# Patient Record
Sex: FEMALE | Race: WHITE | NOT HISPANIC OR LATINO | Employment: UNEMPLOYED | URBAN - METROPOLITAN AREA
[De-identification: names, ages, dates, MRNs, and addresses within clinical notes are randomized per-mention and may not be internally consistent; named-entity substitution may affect disease eponyms.]

---

## 2017-04-12 ENCOUNTER — HOSPITAL ENCOUNTER (EMERGENCY)
Facility: HOSPITAL | Age: 37
Discharge: PSYCHIATRIC OR SUBSTANCE USE FACILITY | End: 2017-04-12
Attending: EMERGENCY MEDICINE
Payer: MEDICAID

## 2017-04-12 VITALS
HEART RATE: 77 BPM | WEIGHT: 110 LBS | TEMPERATURE: 99 F | SYSTOLIC BLOOD PRESSURE: 118 MMHG | BODY MASS INDEX: 17.68 KG/M2 | RESPIRATION RATE: 20 BRPM | HEIGHT: 66 IN | OXYGEN SATURATION: 99 % | DIASTOLIC BLOOD PRESSURE: 65 MMHG

## 2017-04-12 DIAGNOSIS — F19.10 DRUG ABUSE: ICD-10-CM

## 2017-04-12 DIAGNOSIS — R45.851 SUICIDAL IDEATION: Primary | ICD-10-CM

## 2017-04-12 LAB
ALBUMIN SERPL BCP-MCNC: 4.4 G/DL
ALP SERPL-CCNC: 68 U/L
ALT SERPL W/O P-5'-P-CCNC: 16 U/L
AMPHET+METHAMPHET UR QL: ABNORMAL
ANION GAP SERPL CALC-SCNC: 12 MMOL/L
APAP SERPL-MCNC: <3 UG/ML
AST SERPL-CCNC: 29 U/L
B-HCG UR QL: NEGATIVE
BACTERIA #/AREA URNS HPF: ABNORMAL /HPF
BARBITURATES UR QL SCN>200 NG/ML: NEGATIVE
BASOPHILS # BLD AUTO: 0.04 K/UL
BASOPHILS NFR BLD: 0.4 %
BENZODIAZ UR QL SCN>200 NG/ML: NEGATIVE
BILIRUB SERPL-MCNC: 0.4 MG/DL
BILIRUB UR QL STRIP: ABNORMAL
BUN SERPL-MCNC: 22 MG/DL
BZE UR QL SCN: NEGATIVE
CALCIUM SERPL-MCNC: 8.9 MG/DL
CANNABINOIDS UR QL SCN: NEGATIVE
CHLORIDE SERPL-SCNC: 101 MMOL/L
CLARITY UR: CLEAR
CO2 SERPL-SCNC: 21 MMOL/L
COLOR UR: YELLOW
CREAT SERPL-MCNC: 1.1 MG/DL
CREAT UR-MCNC: 442.4 MG/DL
DIFFERENTIAL METHOD: ABNORMAL
EOSINOPHIL # BLD AUTO: 0.2 K/UL
EOSINOPHIL NFR BLD: 1.8 %
ERYTHROCYTE [DISTWIDTH] IN BLOOD BY AUTOMATED COUNT: 12.2 %
EST. GFR  (AFRICAN AMERICAN): >60 ML/MIN/1.73 M^2
EST. GFR  (NON AFRICAN AMERICAN): >60 ML/MIN/1.73 M^2
ETHANOL SERPL-MCNC: <10 MG/DL
GLUCOSE SERPL-MCNC: 79 MG/DL
GLUCOSE UR QL STRIP: NEGATIVE
HCT VFR BLD AUTO: 37.2 %
HGB BLD-MCNC: 13.5 G/DL
HGB UR QL STRIP: NEGATIVE
HYALINE CASTS #/AREA URNS LPF: 3 /LPF
KETONES UR QL STRIP: NEGATIVE
LEUKOCYTE ESTERASE UR QL STRIP: NEGATIVE
LYMPHOCYTES # BLD AUTO: 4 K/UL
LYMPHOCYTES NFR BLD: 40.4 %
MCH RBC QN AUTO: 30.5 PG
MCHC RBC AUTO-ENTMCNC: 36.3 %
MCV RBC AUTO: 84 FL
METHADONE UR QL SCN>300 NG/ML: NEGATIVE
MICROSCOPIC COMMENT: ABNORMAL
MONOCYTES # BLD AUTO: 0.9 K/UL
MONOCYTES NFR BLD: 8.8 %
NEUTROPHILS # BLD AUTO: 4.8 K/UL
NEUTROPHILS NFR BLD: 48.6 %
NITRITE UR QL STRIP: NEGATIVE
OPIATES UR QL SCN: ABNORMAL
PCP UR QL SCN>25 NG/ML: NEGATIVE
PH UR STRIP: 6 [PH] (ref 5–8)
PLATELET # BLD AUTO: 209 K/UL
PMV BLD AUTO: 9.9 FL
POTASSIUM SERPL-SCNC: 3.4 MMOL/L
PROT SERPL-MCNC: 6.8 G/DL
PROT UR QL STRIP: ABNORMAL
RBC # BLD AUTO: 4.42 M/UL
RBC #/AREA URNS HPF: 2 /HPF (ref 0–4)
SALICYLATES SERPL-MCNC: <5 MG/DL
SODIUM SERPL-SCNC: 134 MMOL/L
SP GR UR STRIP: >=1.03 (ref 1–1.03)
TOXICOLOGY INFORMATION: ABNORMAL
TSH SERPL DL<=0.005 MIU/L-ACNC: 1.62 UIU/ML
UNIDENT CRYS URNS QL MICRO: ABNORMAL
URN SPEC COLLECT METH UR: ABNORMAL
UROBILINOGEN UR STRIP-ACNC: NEGATIVE EU/DL
WBC # BLD AUTO: 9.95 K/UL
WBC #/AREA URNS HPF: 5 /HPF (ref 0–5)
WBC CLUMPS URNS QL MICRO: ABNORMAL

## 2017-04-12 PROCEDURE — 80053 COMPREHEN METABOLIC PANEL: CPT

## 2017-04-12 PROCEDURE — 81000 URINALYSIS NONAUTO W/SCOPE: CPT

## 2017-04-12 PROCEDURE — 84443 ASSAY THYROID STIM HORMONE: CPT

## 2017-04-12 PROCEDURE — 80320 DRUG SCREEN QUANTALCOHOLS: CPT

## 2017-04-12 PROCEDURE — 82570 ASSAY OF URINE CREATININE: CPT

## 2017-04-12 PROCEDURE — 85025 COMPLETE CBC W/AUTO DIFF WBC: CPT

## 2017-04-12 PROCEDURE — 80329 ANALGESICS NON-OPIOID 1 OR 2: CPT

## 2017-04-12 PROCEDURE — 99285 EMERGENCY DEPT VISIT HI MDM: CPT

## 2017-04-12 PROCEDURE — 80307 DRUG TEST PRSMV CHEM ANLYZR: CPT | Mod: 59

## 2017-04-12 PROCEDURE — 80307 DRUG TEST PRSMV CHEM ANLYZR: CPT

## 2017-04-12 PROCEDURE — 81025 URINE PREGNANCY TEST: CPT

## 2017-04-12 NOTE — ED NOTES
Pt moved to psych stretcher now available. Pt resting in ER psych stretcher aaox4, rr e/u, NAD noted. Pt remains in grey gown with yellow socks per protocol. Bed low and locked. FRANTZ Arellano at  performing direct observation at this time. Pt denies further needs, will continue to monitor.

## 2017-04-12 NOTE — ED TRIAGE NOTES
PT HAD SOME PROBLEMS WITH FAMILY MOTHER IS SICK AND SHE HAD PROBLEM WITH SISTER AND THE SISTER CALLED THE CORNER AND WAS OPC PT DENIES SUICIDAL IDEATIONS OR WANTING OTHERS

## 2017-04-12 NOTE — ED NOTES
[facility] at bs to transport pt. Pt ambulatory to vehicle with [facility] representative x2 and [facility] ER security.

## 2017-04-12 NOTE — ED NOTES
Pt resting in ER psych stretcher with eyes closed, rr e/u, NAD noted. Pt remains in grey gown with yellow socks per protocol. Bed low and locked. LILLI Arteaga at  performing direct observation at this time. Pt denies further needs, will continue to monitor.

## 2017-04-12 NOTE — ED PROVIDER NOTES
SCRIBE #1 NOTE: I, Nish Hager Claire, am scribing for, and in the presence of, López Rooney MD. I have scribed the entire note.      History      Chief Complaint   Patient presents with    opc       Review of patient's allergies indicates:   Allergen Reactions    Keflex [cephalexin] Hives        HPI   HPI    4/12/2017, 2:47 PM   History obtained from the patient and OPC      History of Present Illness: Michelle Gr is a 36 y.o. female patient who presents to the Emergency Department for psychiatric evaluation. Per OPC filed by pt's sister, pt is depressed and anxious while also abusing drugs and alcohol. OPC states that pt has not slept in 3 days and has been cursing and throwing objects at family members. OPC states that pt threatened to hang herself if the police were called. OPC states that sister is afraid for pt's safety and the safety of others around pt. Pt is denying any SI or HI. Pt states that her sister is lying and does not know why the police were called. Pt states that she is upset because her mother recently had a stroke. Pt denies any CP, LOC, SI, self injury, hallucinations, HI, or suicide attempt.  No further complaints or concerns at this time.       Arrival mode: EBO    PCP: Brooklynn Joiner NP       Past Medical History:  Past Medical History:   Diagnosis Date    Depression        Past Surgical History:  History reviewed. No pertinent surgical history.      Family History:  History reviewed. No pertinent family history.    Social History:  Social History     Social History Main Topics    Smoking status: Current Every Day Smoker    Smokeless tobacco: unknown    Alcohol use Yes    Drug use: Yes     Special: Cocaine, Marijuana, Methamphetamines      Comment: OPIATES    Sexual activity: Unknown       ROS   Review of Systems   Unable to perform ROS: Psychiatric disorder       Physical Exam    Initial Vitals   BP Pulse Resp Temp SpO2   04/12/17 1418 04/12/17 1418  "04/12/17 1418 04/12/17 1418 04/12/17 1418   127/89 87 20 98.9 °F (37.2 °C) 98 %      Physical Exam  Nursing Notes and Vital Signs Reviewed.  Constitutional: Patient is in no acute distress. Awake and alert. Well-developed and well-nourished.  Head: Atraumatic. Normocephalic.  Eyes: PERRL. EOM intact. Conjunctivae are not pale. No scleral icterus.  ENT: Mucous membranes are moist. Oropharynx is clear and symmetric.    Neck: Supple. Full ROM. No lymphadenopathy.  Cardiovascular: Regular rate. Regular rhythm. No murmurs, rubs, or gallops. Distal pulses are 2+ and symmetric.  Pulmonary/Chest: No respiratory distress. Clear to auscultation bilaterally. No wheezing, rales, or rhonchi.  Abdominal: Soft and non-distended.  There is no tenderness.  No rebound, guarding, or rigidity. Good bowel sounds.  Genitourinary: No CVA tenderness  Musculoskeletal: Moves all extremities. No obvious deformities. No edema. No calf tenderness.  Skin: Warm and dry.  Neurological:  Alert, awake, and appropriate.  Normal speech.  No acute focal neurological deficits are appreciated.  Psychiatric:               Behavior: restless and fidgety, manic              Mood and Affect: agitation, anxious              Thought Process: flight of ideas, scattered              Suicidal Ideations: Yes              Suicidal Plan: hanging              Homicidal Ideations: No              Hallucinations: none      ED Course    Procedures  ED Vital Signs:  Vitals:    04/12/17 1418 04/12/17 1730 04/12/17 1825   BP: 127/89 118/79 118/65   Pulse: 87 88 77   Resp: 20 17 20   Temp: 98.9 °F (37.2 °C) 98.9 °F (37.2 °C)    TempSrc: Oral     SpO2: 98% 97% 99%   Weight: 49.9 kg (110 lb)     Height: 5' 6" (1.676 m)         Abnormal Lab Results:  Labs Reviewed   ACETAMINOPHEN LEVEL - Abnormal; Notable for the following:        Result Value    Acetaminophen (Tylenol), Serum <3.0 (*)     All other components within normal limits   CBC W/ AUTO DIFFERENTIAL - Abnormal; Notable " for the following:     MCHC 36.3 (*)     All other components within normal limits   COMPREHENSIVE METABOLIC PANEL - Abnormal; Notable for the following:     Sodium 134 (*)     Potassium 3.4 (*)     CO2 21 (*)     BUN, Bld 22 (*)     All other components within normal limits   DRUG SCREEN PANEL, URINE EMERGENCY - Abnormal; Notable for the following:     Creatinine, Random Ur 442.4 (*)     All other components within normal limits   URINALYSIS - Abnormal; Notable for the following:     Specific Gravity, UA >=1.030 (*)     Protein, UA 1+ (*)     Bilirubin (UA) 1+ (*)     All other components within normal limits   SALICYLATE LEVEL - Abnormal; Notable for the following:     Salicylate Lvl <5.0 (*)     All other components within normal limits   URINALYSIS MICROSCOPIC - Abnormal; Notable for the following:     WBC Clumps, UA Occasional (*)     Bacteria, UA Few (*)     Hyaline Casts, UA 3 (*)     All other components within normal limits   ALCOHOL,MEDICAL (ETHANOL)   TSH   PREGNANCY TEST, URINE RAPID        All Lab Results:  Results for orders placed or performed during the hospital encounter of 04/12/17   Acetaminophen level   Result Value Ref Range    Acetaminophen (Tylenol), Serum <3.0 (L) 10.0 - 20.0 ug/mL   CBC auto differential   Result Value Ref Range    WBC 9.95 3.90 - 12.70 K/uL    RBC 4.42 4.00 - 5.40 M/uL    Hemoglobin 13.5 12.0 - 16.0 g/dL    Hematocrit 37.2 37.0 - 48.5 %    MCV 84 82 - 98 fL    MCH 30.5 27.0 - 31.0 pg    MCHC 36.3 (H) 32.0 - 36.0 %    RDW 12.2 11.5 - 14.5 %    Platelets 209 150 - 350 K/uL    MPV 9.9 9.2 - 12.9 fL    Gran # 4.8 1.8 - 7.7 K/uL    Lymph # 4.0 1.0 - 4.8 K/uL    Mono # 0.9 0.3 - 1.0 K/uL    Eos # 0.2 0.0 - 0.5 K/uL    Baso # 0.04 0.00 - 0.20 K/uL    Gran% 48.6 38.0 - 73.0 %    Lymph% 40.4 18.0 - 48.0 %    Mono% 8.8 4.0 - 15.0 %    Eosinophil% 1.8 0.0 - 8.0 %    Basophil% 0.4 0.0 - 1.9 %    Differential Method Automated    Comprehensive metabolic panel   Result Value Ref Range     Sodium 134 (L) 136 - 145 mmol/L    Potassium 3.4 (L) 3.5 - 5.1 mmol/L    Chloride 101 95 - 110 mmol/L    CO2 21 (L) 23 - 29 mmol/L    Glucose 79 70 - 110 mg/dL    BUN, Bld 22 (H) 6 - 20 mg/dL    Creatinine 1.1 0.5 - 1.4 mg/dL    Calcium 8.9 8.7 - 10.5 mg/dL    Total Protein 6.8 6.0 - 8.4 g/dL    Albumin 4.4 3.5 - 5.2 g/dL    Total Bilirubin 0.4 0.1 - 1.0 mg/dL    Alkaline Phosphatase 68 55 - 135 U/L    AST 29 10 - 40 U/L    ALT 16 10 - 44 U/L    Anion Gap 12 8 - 16 mmol/L    eGFR if African American >60 >60 mL/min/1.73 m^2    eGFR if non African American >60 >60 mL/min/1.73 m^2   Drug screen panel, emergency   Result Value Ref Range    Benzodiazepines Negative     Methadone metabolites Negative     Cocaine (Metab.) Negative     Opiate Scrn, Ur Presumptive Positive     Barbiturate Screen, Ur Negative     Amphetamine Screen, Ur Presumptive Positive     THC Negative     Phencyclidine Negative     Creatinine, Random Ur 442.4 (H) 15.0 - 325.0 mg/dL    Toxicology Information SEE COMMENT    Ethanol   Result Value Ref Range    Alcohol, Medical, Serum <10 <10 mg/dL   Urinalysis   Result Value Ref Range    Specimen UA Urine, Clean Catch     Color, UA Yellow Yellow, Straw, Kiera    Appearance, UA Clear Clear    pH, UA 6.0 5.0 - 8.0    Specific Gravity, UA >=1.030 (A) 1.005 - 1.030    Protein, UA 1+ (A) Negative    Glucose, UA Negative Negative    Ketones, UA Negative Negative    Bilirubin (UA) 1+ (A) Negative    Occult Blood UA Negative Negative    Nitrite, UA Negative Negative    Urobilinogen, UA Negative <2.0 EU/dL    Leukocytes, UA Negative Negative   TSH   Result Value Ref Range    TSH 1.616 0.400 - 4.000 uIU/mL   Salicylate level   Result Value Ref Range    Salicylate Lvl <5.0 (L) 15.0 - 30.0 mg/dL   Pregnancy, urine rapid   Result Value Ref Range    Preg Test, Ur Negative    Urinalysis Microscopic   Result Value Ref Range    RBC, UA 2 0 - 4 /hpf    WBC, UA 5 0 - 5 /hpf    WBC Clumps, UA Occasional (A) None-Rare     Bacteria, UA Few (A) None-Occ /hpf    Hyaline Casts, UA 3 (A) 0-1/lpf /lpf    Unclass Ines UA Occasional None-Moderate    Microscopic Comment SEE COMMENT               The Emergency Provider reviewed the vital signs and test results, which are outlined above.    ED Discussion     2:47 PM: The [facility]EC hold has been issued by Dr. Rooney at this time for acute psychosis.    3:57 PM: Pt has been medically cleared by Dr. Rooney at this time. Reassessed pt at this time. Pt is resting comfortably and appears in no acute distress. There are no psychiatric services offered at this facility. D/w pt all pertinent ED information and plan to transfer to psychiatric facility Marcelo tillman MD is Dr. Montes De Oca for psychiatric treatment. Patient being transferred by [facility] secondary to need for ongoing personal protection en route. CPR and CPI care will be required en route. All questions and complaints have been addressed at this time. Pt condition is stable at this time and is clear to transfer to psychiatric facility at this time.  No care required during transfer.    ED Medication(s):  Medications - No data to display    Discharge Medication List as of 4/12/2017  6:49 PM                Medical Decision Making    Medical Decision Making:   Clinical Tests:   Lab Tests: Ordered and Reviewed           Scribe Attestation:   Scribe #1: I performed the above scribed service and the documentation accurately describes the services I performed. I attest to the accuracy of the note.    Attending:   Physician Attestation Statement for Scribe #1: I, López Rooney MD, personally performed the services described in this documentation, as scribed by Nish Rangel, in my presence, and it is both accurate and complete.          Clinical Impression       ICD-10-CM ICD-9-CM   1. Suicidal ideation R45.851 V62.84   2. Drug abuse F19.10 305.90       Disposition:   Disposition: Transferred  Condition: Stable         Brody Szymanski  MD  04/16/17 0648

## 2017-04-12 NOTE — ED NOTES
Assumed care of pt at this time. Pt resting in psych stretcher in grey gown with yellow socks per protocol. Pt is aaox4 at this time, denies acute SI, HI and hallucinations. Bed low and locked with LILLI Arteaga at  performing direct observation. Pt denies further needs, will continue to monitor.

## 2017-04-12 NOTE — ED NOTES
Pt accepted at [facility] per Dr. Montes De Oca. Report to be called to [phone]. Pt updated on status and POC; denies further needs. Will continue to monitor.

## 2017-04-12 NOTE — ED NOTES
"ACKNOWLEDGMENT OF RIGHTS SIGNED & PLACED ON CHART.     SAFE WORD: "Pineapple"    PEC process explained to pt. Educated on visiting hours. Verbalizes understanding of POC. Pt remains in grey gown & yellow non slip socks. LILLI Arteaga at bedside for direct observation. NAD noted. Denies any other needs at the time. Will continue to monitor.    CONTACT #1  Delbert Wright-boyfriend  CELL: [phone]    Patient belongings include: one pair of plaid pajama pants and one grey t-shirt. PATIENT BELONGINGS SECURED IN radiology locker #1.    "

## 2017-07-04 ENCOUNTER — HOSPITAL ENCOUNTER (EMERGENCY)
Facility: HOSPITAL | Age: 37
Discharge: ANOTHER HOSPITAL | End: 2017-07-05
Attending: EMERGENCY MEDICINE
Payer: MEDICAID

## 2017-07-04 DIAGNOSIS — R93.0 ABNORMAL CT OF THE HEAD: Primary | ICD-10-CM

## 2017-07-04 DIAGNOSIS — Z23 DIPHTHERIA-TETANUS-PERTUSSIS (DTP) VACCINATION: ICD-10-CM

## 2017-07-04 DIAGNOSIS — R91.1 NODULE OF LEFT LUNG: ICD-10-CM

## 2017-07-04 DIAGNOSIS — S00.81XA ABRASION OF CHEEK, INITIAL ENCOUNTER: ICD-10-CM

## 2017-07-04 DIAGNOSIS — R41.82 ALTERED MENTAL STATUS: ICD-10-CM
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
## 2017-07-04 DIAGNOSIS — Z01.818 ENCOUNTER FOR INTUBATION: ICD-10-CM

## 2017-07-04 DIAGNOSIS — F19.90 DRUG USE: ICD-10-CM

## 2017-07-04 DIAGNOSIS — S02.2XXA CLOSED FRACTURE OF NASAL BONE, INITIAL ENCOUNTER: ICD-10-CM

## 2017-07-04 LAB
ALBUMIN SERPL BCP-MCNC: 4 G/DL
ALP SERPL-CCNC: 49 U/L
ALT SERPL W/O P-5'-P-CCNC: 13 U/L
AMPHET+METHAMPHET UR QL: NORMAL
AMPHET+METHAMPHET UR QL: NORMAL
ANION GAP SERPL CALC-SCNC: 10 MMOL/L
APAP SERPL-MCNC: <3 UG/ML
APTT BLDCRRT: <21 SEC
AST SERPL-CCNC: 18 U/L
B-HCG UR QL: NEGATIVE
BACTERIA #/AREA URNS HPF: ABNORMAL /HPF
BARBITURATES UR QL SCN>200 NG/ML: NEGATIVE
BARBITURATES UR QL SCN>200 NG/ML: NEGATIVE
BASOPHILS # BLD AUTO: 0.01 K/UL
BASOPHILS NFR BLD: 0.1 %
BENZODIAZ UR QL SCN>200 NG/ML: NEGATIVE
BENZODIAZ UR QL SCN>200 NG/ML: NEGATIVE
BILIRUB SERPL-MCNC: 0.2 MG/DL
BILIRUB UR QL STRIP: NEGATIVE
BUN SERPL-MCNC: 14 MG/DL
BZE UR QL SCN: NEGATIVE
BZE UR QL SCN: NEGATIVE
CALCIUM SERPL-MCNC: 8.7 MG/DL
CANNABINOIDS UR QL SCN: NORMAL
CANNABINOIDS UR QL SCN: NORMAL
CHLORIDE SERPL-SCNC: 109 MMOL/L
CLARITY UR: CLEAR
CO2 SERPL-SCNC: 23 MMOL/L
COLOR UR: YELLOW
CREAT SERPL-MCNC: 0.9 MG/DL
CREAT UR-MCNC: 121.8 MG/DL
CREAT UR-MCNC: 121.8 MG/DL
DIFFERENTIAL METHOD: ABNORMAL
EOSINOPHIL # BLD AUTO: 0 K/UL
EOSINOPHIL NFR BLD: 0.4 %
ERYTHROCYTE [DISTWIDTH] IN BLOOD BY AUTOMATED COUNT: 12.7 %
EST. GFR  (AFRICAN AMERICAN): >60 ML/MIN/1.73 M^2
EST. GFR  (NON AFRICAN AMERICAN): >60 ML/MIN/1.73 M^2
ETHANOL SERPL-MCNC: <10 MG/DL
GLUCOSE SERPL-MCNC: 128 MG/DL
GLUCOSE UR QL STRIP: NEGATIVE
HCT VFR BLD AUTO: 40.2 %
HGB BLD-MCNC: 13.9 G/DL
HGB UR QL STRIP: NEGATIVE
HYALINE CASTS #/AREA URNS LPF: 0 /LPF
INR PPP: 1
KETONES UR QL STRIP: NEGATIVE
LEUKOCYTE ESTERASE UR QL STRIP: NEGATIVE
LYMPHOCYTES # BLD AUTO: 1.7 K/UL
LYMPHOCYTES NFR BLD: 18.4 %
MCH RBC QN AUTO: 31 PG
MCHC RBC AUTO-ENTMCNC: 34.6 %
MCV RBC AUTO: 90 FL
METHADONE UR QL SCN>300 NG/ML: NEGATIVE
METHADONE UR QL SCN>300 NG/ML: NEGATIVE
MICROSCOPIC COMMENT: ABNORMAL
MONOCYTES # BLD AUTO: 0.7 K/UL
MONOCYTES NFR BLD: 8 %
NEUTROPHILS # BLD AUTO: 6.6 K/UL
NEUTROPHILS NFR BLD: 73.3 %
NITRITE UR QL STRIP: POSITIVE
OPIATES UR QL SCN: NORMAL
OPIATES UR QL SCN: NORMAL
PCP UR QL SCN>25 NG/ML: NEGATIVE
PCP UR QL SCN>25 NG/ML: NEGATIVE
PH UR STRIP: 6 [PH] (ref 5–8)
PLATELET # BLD AUTO: 148 K/UL
PLATELET BLD QL SMEAR: ABNORMAL
PMV BLD AUTO: 11.5 FL
POCT GLUCOSE: 145 MG/DL (ref 70–110)
POTASSIUM SERPL-SCNC: 3.8 MMOL/L
PROT SERPL-MCNC: 7 G/DL
PROT UR QL STRIP: ABNORMAL
PROTHROMBIN TIME: 10.2 SEC
RBC # BLD AUTO: 4.49 M/UL
RBC #/AREA URNS HPF: 0 /HPF (ref 0–4)
SALICYLATES SERPL-MCNC: <5 MG/DL
SODIUM SERPL-SCNC: 142 MMOL/L
SP GR UR STRIP: 1.02 (ref 1–1.03)
SQUAMOUS #/AREA URNS HPF: 2 /HPF
TOXICOLOGY INFORMATION: NORMAL
TOXICOLOGY INFORMATION: NORMAL
URN SPEC COLLECT METH UR: ABNORMAL
UROBILINOGEN UR STRIP-ACNC: NEGATIVE EU/DL
WBC # BLD AUTO: 9.04 K/UL
WBC #/AREA URNS HPF: 4 /HPF (ref 0–5)

## 2017-07-04 PROCEDURE — 63600175 PHARM REV CODE 636 W HCPCS

## 2017-07-04 PROCEDURE — 80307 DRUG TEST PRSMV CHEM ANLYZR: CPT

## 2017-07-04 PROCEDURE — 99291 CRITICAL CARE FIRST HOUR: CPT | Mod: 25

## 2017-07-04 PROCEDURE — 31500 INSERT EMERGENCY AIRWAY: CPT

## 2017-07-04 PROCEDURE — 25000003 PHARM REV CODE 250: Performed by: EMERGENCY MEDICINE

## 2017-07-04 PROCEDURE — 80053 COMPREHEN METABOLIC PANEL: CPT

## 2017-07-04 PROCEDURE — 81025 URINE PREGNANCY TEST: CPT

## 2017-07-04 PROCEDURE — 96376 TX/PRO/DX INJ SAME DRUG ADON: CPT

## 2017-07-04 PROCEDURE — 81000 URINALYSIS NONAUTO W/SCOPE: CPT

## 2017-07-04 PROCEDURE — 85610 PROTHROMBIN TIME: CPT

## 2017-07-04 PROCEDURE — 96374 THER/PROPH/DIAG INJ IV PUSH: CPT

## 2017-07-04 PROCEDURE — 85025 COMPLETE CBC W/AUTO DIFF WBC: CPT

## 2017-07-04 PROCEDURE — 63600175 PHARM REV CODE 636 W HCPCS: Performed by: EMERGENCY MEDICINE

## 2017-07-04 PROCEDURE — 96361 HYDRATE IV INFUSION ADD-ON: CPT

## 2017-07-04 PROCEDURE — 82962 GLUCOSE BLOOD TEST: CPT

## 2017-07-04 PROCEDURE — 90471 IMMUNIZATION ADMIN: CPT | Performed by: EMERGENCY MEDICINE

## 2017-07-04 PROCEDURE — 85730 THROMBOPLASTIN TIME PARTIAL: CPT

## 2017-07-04 PROCEDURE — 80329 ANALGESICS NON-OPIOID 1 OR 2: CPT

## 2017-07-04 PROCEDURE — 96375 TX/PRO/DX INJ NEW DRUG ADDON: CPT

## 2017-07-04 PROCEDURE — 80320 DRUG SCREEN QUANTALCOHOLS: CPT

## 2017-07-04 PROCEDURE — 90715 TDAP VACCINE 7 YRS/> IM: CPT | Performed by: EMERGENCY MEDICINE

## 2017-07-04 PROCEDURE — 25000003 PHARM REV CODE 250

## 2017-07-04 RX ORDER — PROPOFOL 10 MG/ML
5 INJECTION, EMULSION INTRAVENOUS CONTINUOUS
Status: DISCONTINUED | OUTPATIENT
Start: 2017-07-05 | End: 2017-07-05 | Stop reason: HOSPADM

## 2017-07-04 RX ORDER — NALOXONE HYDROCHLORIDE 1 MG/ML
INJECTION INTRAMUSCULAR; INTRAVENOUS; SUBCUTANEOUS
Status: COMPLETED
Start: 2017-07-04 | End: 2017-07-04

## 2017-07-04 RX ORDER — SODIUM CHLORIDE 9 MG/ML
1000 INJECTION, SOLUTION INTRAVENOUS
Status: COMPLETED | OUTPATIENT
Start: 2017-07-04 | End: 2017-07-04

## 2017-07-04 RX ORDER — NALOXONE HCL 0.4 MG/ML
1 VIAL (ML) INJECTION ONCE
Status: DISCONTINUED | OUTPATIENT
Start: 2017-07-04 | End: 2017-07-04

## 2017-07-04 RX ORDER — ETOMIDATE 2 MG/ML
20 INJECTION INTRAVENOUS
Status: COMPLETED | OUTPATIENT
Start: 2017-07-05 | End: 2017-07-04

## 2017-07-04 RX ADMIN — ETOMIDATE 20 MG: 2 INJECTION, SOLUTION INTRAVENOUS at 11:07

## 2017-07-04 RX ADMIN — CLOSTRIDIUM TETANI TOXOID ANTIGEN (FORMALDEHYDE INACTIVATED), CORYNEBACTERIUM DIPHTHERIAE TOXOID ANTIGEN (FORMALDEHYDE INACTIVATED), BORDETELLA PERTUSSIS TOXOID ANTIGEN (GLUTARALDEHYDE INACTIVATED), BORDETELLA PERTUSSIS FILAMENTOUS HEMAGGLUTININ ANTIGEN (FORMALDEHYDE INACTIVATED), BORDETELLA PERTUSSIS PERTACTIN ANTIGEN, AND BORDETELLA PERTUSSIS FIMBRIAE 2/3 ANTIGEN 0.5 ML: 5; 2; 2.5; 5; 3; 5 INJECTION, SUSPENSION INTRAMUSCULAR at 08:07

## 2017-07-04 RX ADMIN — PROPOFOL 40 MG: 10 INJECTION, EMULSION INTRAVENOUS at 11:07

## 2017-07-04 RX ADMIN — NALOXONE HYDROCHLORIDE 1 MG: 1 INJECTION PARENTERAL at 09:07

## 2017-07-04 RX ADMIN — SODIUM CHLORIDE 1000 ML: 0.9 INJECTION, SOLUTION INTRAVENOUS at 08:07

## 2017-07-04 RX ADMIN — VANCOMYCIN HYDROCHLORIDE 1000 MG: 1 INJECTION, POWDER, LYOPHILIZED, FOR SOLUTION INTRAVENOUS at 11:07

## 2017-07-05 VITALS
SYSTOLIC BLOOD PRESSURE: 120 MMHG | BODY MASS INDEX: 21.97 KG/M2 | RESPIRATION RATE: 16 BRPM | HEART RATE: 84 BPM | HEIGHT: 67 IN | WEIGHT: 140 LBS | OXYGEN SATURATION: 100 % | TEMPERATURE: 99 F | DIASTOLIC BLOOD PRESSURE: 79 MMHG

## 2017-07-05 PROCEDURE — 63600175 PHARM REV CODE 636 W HCPCS: Performed by: EMERGENCY MEDICINE

## 2017-07-05 PROCEDURE — 27000221 HC OXYGEN, UP TO 24 HOURS

## 2017-07-05 PROCEDURE — 99900035 HC TECH TIME PER 15 MIN (STAT)

## 2017-07-05 PROCEDURE — 27100108

## 2017-07-05 PROCEDURE — 27200966 HC CLOSED SUCTION SYSTEM

## 2017-07-05 PROCEDURE — 94002 VENT MGMT INPAT INIT DAY: CPT

## 2017-07-05 PROCEDURE — 25000003 PHARM REV CODE 250: Performed by: EMERGENCY MEDICINE

## 2017-07-05 RX ORDER — PROPOFOL 10 MG/ML
40 VIAL (ML) INTRAVENOUS ONCE
Status: COMPLETED | OUTPATIENT
Start: 2017-07-05 | End: 2017-07-04

## 2017-07-05 RX ADMIN — PROPOFOL 5 MCG/KG/MIN: 10 INJECTION, EMULSION INTRAVENOUS at 12:07

## 2017-07-05 NOTE — ED NOTES
Pt remains unresponsive to verbal stimuli. Only awakens to painful stimuli with no meaningful purposeful movements. MD aware no new orders given. At  continuing to monitor pt.

## 2017-07-05 NOTE — ED NOTES
"Pt arrived with C collar and unresponsive to verbal stimuli. Pt has bloody lips, swollen upper lip with dry blood noted. Pt pupils pin point, sluggish. + gag reflex, rr e/u. Retracts from painful stimuli. Unable to give history or answer questions. Pt  Found this way and "friend" called 911. Pt has track marks bilaterally forearms. Evidence of possible substance abuse.   "

## 2017-07-05 NOTE — ED NOTES
Pt remains in c collar. Neuro status unchanged. MD aWARE. PT TO BE TRANSPORT TO [facility] FOR NEURO. PT REMANS WITH GAG, RR E/U. PUPILS PIN POINT SLUGGISH. EQUAL.

## 2017-07-05 NOTE — ED NOTES
Pt had episode of sitting up without making eye contact or purposeful movement. Moved legs and arms forward and sat up in bed on the way back from ct. Then immediately went back to previous neuro status.

## 2017-07-05 NOTE — ED NOTES
No response to narcan. Pt remains non verbal. Retracts to painful stimuli. Pt in collar from EMS.

## 2017-07-05 NOTE — ED NOTES
rissa at  again for pt transport. Pt intubated. Airway protected for transport. Vss. [facility] updated on pt status.

## 2017-07-05 NOTE — ED PROVIDER NOTES
SCRIBE #1 NOTE: I, Ayanna Barraza, am scribing for, and in the presence of, Lennie Munoz DO. I have scribed the entire note.      History      Chief Complaint   Patient presents with    Fall     Fell earlier today hitting face refused transport. EMS called again due to decrease in mental status . Person with her reports took pills ,unsure of type       Review of patient's allergies indicates:   Allergen Reactions    Keflex [cephalexin] Hives        HPI   HPI    7/4/2017, 8:41 PM   History obtained from EMS      History of Present Illness: Michelle Gr is a 36 y.o. female patient who presents to the Emergency Department for fall which onset today around 1915 per EMS. EMS reports a GCS of 12 and states they were called out twice. States that the first call was around 1900 and pt denied help. States that the second call was around 1930 after pt had taken neurontin (and possibly other pills) and fallen again. Symptoms are constant and moderate in severity.  No mitigating or exacerbating factors reported. Pt is unresponsive therefore HPI is limited.  No further complaints or concerns at this time.         Arrival mode: EMS    PCP: Brooklynn Joiner NP       Past Medical History:  Past Medical History:   Diagnosis Date    Depression        Past Surgical History:  History reviewed. No pertinent surgical history.      Family History:  History reviewed. No pertinent family history.    Social History:  Social History     Social History Main Topics    Smoking status: Current Every Day Smoker    Smokeless tobacco: Unknown    Alcohol use Yes    Drug use:      Types: Cocaine, Marijuana, Methamphetamines      Comment: OPIATES    Sexual activity: Unknown       ROS   Review of Systems   Unable to perform ROS: Patient unresponsive       Physical Exam      Initial Vitals   BP Pulse Resp Temp SpO2   07/04/17 2024 07/04/17 2024 07/04/17 2024 07/04/17 2028 07/04/17 2024   120/78 88 16 98.1 °F (36.7 °C) 100 %      MAP        07/04/17 2024 92          Physical Exam  Nursing Notes and Vital Signs Reviewed.  Constitutional: Patient is in no apparent distress.GCS: 7 (E1 V1 M5)  Head: . Normocephalic.  Eyes: PERRL. Pinpoint. Conjunctivae are not pale. No scleral icterus.  ENT: Mucous membranes are moist. Oropharynx is clear and symmetric. Gag reflex intact.  No septal hematoma  Neck: C collar in place.  No crepitance  Cardiovascular: Regular rate. Regular rhythm. No murmurs, rubs, or gallops. Distal pulses are 2+ and symmetric.  Pulmonary/Chest: No respiratory distress. Clear to auscultation bilaterally. No wheezing, rales, or rhonchi.  Abdominal: Soft and non-distended. Good bowel sounds.  Musculoskeletal: No obvious deformities. Abrasion noted to left knee.   Skin: Warm and dry. Abrasions noted to L cheek , inferior left nares.Track marks noted to bilat arms.  Neurological:  GCS = 7 Patellar reflexes are equal and 2+ bilat.           ED Course    Critical Care  Date/Time: 7/4/2017 8:40 PM  Performed by: ZENON QUIJANO  Authorized by: ZENON QUIJANO   Direct patient critical care time: 10 minutes  Additional history critical care time: 5 minutes  Ordering / reviewing critical care time: 5 minutes  Documentation critical care time: 5 minutes  Consulting other physicians critical care time: 5 minutes  Consult with family critical care time: 5 minutes  Other critical care time: 5 minutes  Total critical care time (exclusive of procedural time) : 40 minutes  Critical care time was exclusive of separately billable procedures and treating other patients.  Critical care was necessary to treat or prevent imminent or life-threatening deterioration of the following conditions: CNS failure or compromise and toxidrome (neurologic).  Critical care was time spent personally by me on the following activities: blood draw for specimens, discussions with consultants, evaluation of patient's response to treatment, obtaining history from  patient or surrogate, ordering and review of laboratory studies, pulse oximetry, interpretation of cardiac output measurements, examination of patient, ordering and performing treatments and interventions, ordering and review of radiographic studies and re-evaluation of patient's condition.    Intubation  Date/Time: 7/4/2017 11:47 PM  Performed by: ZENON QUIJANO  Authorized by: ZENON QUIJANO   Consent Done: Emergent Situation  Indications: airway protection  Patient status: sedated  Sedatives: etomidate and propofol  Paralytic: none  Laryngoscope size: Mac 4  Tube size: 7.5 mm  Tube type: cuffed  Number of attempts: 1  Cricoid pressure: no  Cords visualized: yes  Post-procedure assessment: CO2 detector  Breath sounds: clear (equal, positive CO2 change)  Cuff inflated: yes  ETT to lip: 23 cm  Tube secured with: ties  Chest x-ray interpreted by me.  Chest x-ray findings: endotracheal tube in appropriate position  Patient tolerance: Patient tolerated the procedure well with no immediate complications  Complications: No  Comments: Et tube pulled back to 22.      Note:  Patient intubated after CT scan performed.   No bag/mask used.   Intubation easily performed with one attempt.    ED Vital Signs:  Vitals:    07/04/17 2039 07/04/17 2059 07/04/17 2120 07/04/17 2149   BP: 116/73 (!) 98/58 110/69 126/73   Pulse: 86 86 87    Resp: 17 16 19    Temp:       TempSrc:       SpO2: 100% 100% 100% 100%   Weight:       Height:        07/04/17 2204 07/04/17 2210 07/04/17 2219 07/04/17 2234   BP: 117/75  104/66 107/60   Pulse: 90  87 87   Resp: 17 16 16   Temp:  98.2 °F (36.8 °C)     TempSrc:  Axillary     SpO2: 99%  100% 99%   Weight:       Height:        07/04/17 2304 07/04/17 2319 07/04/17 2323 07/05/17 0003   BP: 115/81 115/80  122/86   Pulse: 79 79  86   Resp: 14 17 16   Temp:   98.9 °F (37.2 °C)    TempSrc:   Rectal    SpO2: 100% 99%  100%   Weight:       Height:        07/05/17 0012 07/05/17 0016 07/05/17 0018   BP:    120/79   Pulse: 85 85 84   Resp:   16   Temp:      TempSrc:      SpO2: 100% 100% 100%   Weight:      Height:          Abnormal Lab Results:  Labs Reviewed   CBC W/ AUTO DIFFERENTIAL - Abnormal; Notable for the following:        Result Value    Platelets 148 (*)     Gran% 73.3 (*)     Platelet Estimate Clumped (*)     All other components within normal limits   COMPREHENSIVE METABOLIC PANEL - Abnormal; Notable for the following:     Glucose 128 (*)     Alkaline Phosphatase 49 (*)     All other components within normal limits   ACETAMINOPHEN LEVEL - Abnormal; Notable for the following:     Acetaminophen (Tylenol), Serum <3.0 (*)     All other components within normal limits   SALICYLATE LEVEL - Abnormal; Notable for the following:     Salicylate Lvl <5.0 (*)     All other components within normal limits   URINALYSIS - Abnormal; Notable for the following:     Protein, UA 2+ (*)     Nitrite, UA Positive (*)     All other components within normal limits   URINALYSIS MICROSCOPIC - Abnormal; Notable for the following:     Bacteria, UA Moderate (*)     All other components within normal limits   POCT GLUCOSE - Abnormal; Notable for the following:     POCT Glucose 145 (*)     All other components within normal limits   ALCOHOL,MEDICAL (ETHANOL)   PREGNANCY TEST, URINE RAPID   DRUG SCREEN PANEL, URINE EMERGENCY   PROTIME-INR   APTT   DRUG SCREEN PANEL, URINE EMERGENCY        All Lab Results:  Results for orders placed or performed during the hospital encounter of 07/04/17   CBC auto differential   Result Value Ref Range    WBC 9.04 3.90 - 12.70 K/uL    RBC 4.49 4.00 - 5.40 M/uL    Hemoglobin 13.9 12.0 - 16.0 g/dL    Hematocrit 40.2 37.0 - 48.5 %    MCV 90 82 - 98 fL    MCH 31.0 27.0 - 31.0 pg    MCHC 34.6 32.0 - 36.0 %    RDW 12.7 11.5 - 14.5 %    Platelets 148 (L) 150 - 350 K/uL    MPV 11.5 9.2 - 12.9 fL    Gran # 6.6 1.8 - 7.7 K/uL    Lymph # 1.7 1.0 - 4.8 K/uL    Mono # 0.7 0.3 - 1.0 K/uL    Eos # 0.0 0.0 - 0.5 K/uL     Baso # 0.01 0.00 - 0.20 K/uL    Gran% 73.3 (H) 38.0 - 73.0 %    Lymph% 18.4 18.0 - 48.0 %    Mono% 8.0 4.0 - 15.0 %    Eosinophil% 0.4 0.0 - 8.0 %    Basophil% 0.1 0.0 - 1.9 %    Platelet Estimate Clumped (A)     Differential Method Automated    Comprehensive metabolic panel   Result Value Ref Range    Sodium 142 136 - 145 mmol/L    Potassium 3.8 3.5 - 5.1 mmol/L    Chloride 109 95 - 110 mmol/L    CO2 23 23 - 29 mmol/L    Glucose 128 (H) 70 - 110 mg/dL    BUN, Bld 14 6 - 20 mg/dL    Creatinine 0.9 0.5 - 1.4 mg/dL    Calcium 8.7 8.7 - 10.5 mg/dL    Total Protein 7.0 6.0 - 8.4 g/dL    Albumin 4.0 3.5 - 5.2 g/dL    Total Bilirubin 0.2 0.1 - 1.0 mg/dL    Alkaline Phosphatase 49 (L) 55 - 135 U/L    AST 18 10 - 40 U/L    ALT 13 10 - 44 U/L    Anion Gap 10 8 - 16 mmol/L    eGFR if African American >60 >60 mL/min/1.73 m^2    eGFR if non African American >60 >60 mL/min/1.73 m^2   Acetaminophen level   Result Value Ref Range    Acetaminophen (Tylenol), Serum <3.0 (L) 10.0 - 20.0 ug/mL   Ethanol   Result Value Ref Range    Alcohol, Medical, Serum <10 <10 mg/dL   Salicylate level   Result Value Ref Range    Salicylate Lvl <5.0 (L) 15.0 - 30.0 mg/dL   Urinalysis   Result Value Ref Range    Specimen UA Urine, Catheterized     Color, UA Yellow Yellow, Straw, Kiera    Appearance, UA Clear Clear    pH, UA 6.0 5.0 - 8.0    Specific Gravity, UA 1.020 1.005 - 1.030    Protein, UA 2+ (A) Negative    Glucose, UA Negative Negative    Ketones, UA Negative Negative    Bilirubin (UA) Negative Negative    Occult Blood UA Negative Negative    Nitrite, UA Positive (A) Negative    Urobilinogen, UA Negative <2.0 EU/dL    Leukocytes, UA Negative Negative   Pregnancy, urine rapid   Result Value Ref Range    Preg Test, Ur Negative    Drug screen panel, emergency   Result Value Ref Range    Benzodiazepines Negative     Methadone metabolites Negative     Cocaine (Metab.) Negative     Opiate Scrn, Ur Presumptive Positive     Barbiturate Screen, Ur  Negative     Amphetamine Screen, Ur Presumptive Positive     THC Presumptive Positive     Phencyclidine Negative     Creatinine, Random Ur 121.8 15.0 - 325.0 mg/dL    Toxicology Information SEE COMMENT    Protime-INR   Result Value Ref Range    Prothrombin Time 10.2 9.0 - 12.5 sec    INR 1.0 0.8 - 1.2   APTT   Result Value Ref Range    aPTT <21.0 21.0 - 32.0 sec   Drug screen panel, emergency   Result Value Ref Range    Benzodiazepines Negative     Methadone metabolites Negative     Cocaine (Metab.) Negative     Opiate Scrn, Ur Presumptive Positive     Barbiturate Screen, Ur Negative     Amphetamine Screen, Ur Presumptive Positive     THC Presumptive Positive     Phencyclidine Negative     Creatinine, Random Ur 121.8 15.0 - 325.0 mg/dL    Toxicology Information SEE COMMENT    Urinalysis Microscopic   Result Value Ref Range    RBC, UA 0 0 - 4 /hpf    WBC, UA 4 0 - 5 /hpf    Bacteria, UA Moderate (A) None-Occ /hpf    Squam Epithel, UA 2 /hpf    Hyaline Casts, UA 0 0-1/lpf /lpf    Microscopic Comment SEE COMMENT    POCT glucose   Result Value Ref Range    POCT Glucose 145 (H) 70 - 110 mg/dL         Imaging Results:  Imaging Results          X-Ray Chest 1 View for Tube Placement (In process)                CT Head Without Contrast (Final result)  Result time 07/04/17 21:54:56    Final result by Obey Chadwick MD (07/04/17 21:54:56)                 Impression:     Normal CT of the brain.  Motion artifact limits detail.      Electronically signed by: OBEY CHADWICK MD  Date:     07/04/17  Time:    21:54              Narrative:    The CT of the brain without contrast    Clinical indication: Transient alteration of awareness    Findings: Patient moved considerably during both attempts at scanning the brain.  The ventricles are normal in size and position without midline shift.  Cerebral density is normal.  No hemorrhage or mass effect.                             CT Cervical Spine Without Contrast (Final result)  Result  time 07/04/17 22:11:34    Final result by Obey Chadwick MD (07/04/17 22:11:34)                 Impression:      1: The cervical spine is intact without fractures or subluxation.  There is bulging of the C6-C7 disc  2: Subcutaneous emphysema as described above with subarachnoid air bubbles and the cavernous sinus region and around C1 and C2 as well as posterior nasal pharynx, around the left mandible and right sternocleidomastoid muscle.        Electronically signed by: OBEY CHADWICK MD  Date:     07/04/17  Time:    22:11              Narrative:    CT of the cervical spine without contrast    Clinical indication: Neck pain acute trauma    Findings: Axial imaging was obtained through the cervical spine with sagittal and coronal reconstructions.  The cervical spine is intact without fractures or subluxations.  There is mild spurring around the C5-C6 disc space but the thecal sac is preserved.  There is annular bulging of the C6-C7 disc mildly compressing anterior thecal sac.    There are small air bubbles in the soft tissues in the deep to the right sternocleidomastoid muscle, around the left mandible, just posterior nasal pharynx and extending along the anterior aspect of the thecal sac at the C1-C2 level in the subarachnoid space.  Small subarachnoid air bubbles are also present posterior to the C2 vertebral body and in the left C2-C3 and 3-4 neural foramina..  There is no motion artifact to the base of the brain on the CT of the cervical spine and there are multiple air bubbles to the cavernous sinuses on both sides as well.  There are air bubbles in the soft tissues posterior nasal pharynx.  The exact origin of these subarachnoid air bubbles are uncertain.  I do not see a discrete cribriform plate fracture.                             CT Maxillofacial Without Contrast (Final result)  Result time 07/04/17 22:14:40    Final result by Obey Chadwick MD (07/04/17 22:14:40)                 Impression:       There is fracturing of the nasal bones anteriorly.  Subcutaneous emphysema as described above along with subarachnoid air as described above.      Electronically signed by: OBEY CHADWICK MD  Date:     07/04/17  Time:    22:14              Narrative:    CT of the facial bones    Clinical indication: Facial trauma    Findings: Axial imaging was obtained through the facial bones with sagittal and coronal reconstructions.  As mentioned on the CT the cervical spine there are air bubbles in the cavernous sinus region on both sides extending into the posterior retro-orbital regions.  Small air bubbles are present in the subarachnoid space of the upper cervical spine and posterior to the nasal pharynx.  There are some small air bubbles around the left mandible.  There does appear to be fracturing of the nasal bones.  I do not see a discrete fracture of the cribriform plate.  Cannot definitely explain the origin of these air bubbles.  There is turbinate congestion.  The orbital rims are intact.  There is a metal plate along the left mandible.  No definite mandibular fractures.                             X-Ray Chest AP Portable (Final result)  Result time 07/04/17 21:27:40    Final result by Obey Chadwick MD (07/04/17 21:27:40)                 Impression:     2 cm nodule in the left lower lung field overlapping left posterior 8th rib.  Cannot exclude neoplasm.  Further evaluation is needed.  No active infiltrates      Electronically signed by: OBEY CHADWICK MD  Date:     07/04/17  Time:    21:27              Narrative:    Portable chest    Clinical indication: Chest pain and transient alteration of awareness    Findings: The heart is normal in size.  There is a 2 cm nodule overlapping the right posterior 8th rib.  The lungs elsewhere are clear.  No active infiltrates.                                    The Emergency Provider reviewed the vital signs and test results, which are outlined above.    ED Discussion   8:54  PM: Pt's respiration and level of consciousness is declining.  No response to Narcan.    21:30 Patient returning from CT Patient thrashing on bed, confused.  Airway intact.     10:50 PM: Consult with Tania Rosado () at [facility] concerning pt. There are no neurosurgical services, which the patient requires, offered at [facility] at this time.  (neurosurgeon) expresses understanding and will accept transfer for neurosurgical services  Accepting Facility: [facility]  Accepting Physician: Dr. Núñez (neurosurgeon)    10:59 PM: Re-evaluated patient:   Pupils 8 mm, bilateral, reactive.  No eye opening to painful stimulus, Mumbles to painful stimulus, withdrawals from pain. GCS = 8.  Positive gag - patient sits up in bed after checking gag and rolls to the side.  There are no family at bedside to discuss.  Patient is obtunded.  Patient requires neurosurgical care, which is not available at this facility.  Discussed case with Michael Rosado () - Dr. KACEY Núñez is accepting. Risks:  MVC, death, seizure, permanent neurologic damage.  Benefits:   Neurosurgical services.  Benefits of transfer outweigh risks.        11:43 PM: Re-evaluated pt neurologically. Pt does not have a gag reflex at this time .Pt's pupils were noted to be 2+ mm and reactive. Pt will be intubated.    11:48 PM: Prior to intubation pt yannick legs to chest legs and crossed them at ankles almost bringing ankles above head  Neuro exam inconsistent.  Will intubate to protect airway in rout.    12:05 AM: Dr. Tammy DO discussed the pt's case with Alyssa at [facility] (). Notified Alyssa that pt has been intubated.     ED Medication(s):  Medications   0.9%  NaCl infusion (0 mLs Intravenous Stopped 7/4/17 2307)   Tdap vaccine injection 0.5 mL (0.5 mLs Intramuscular Given 7/4/17 2055)   naloxone (NARCAN) 1 mg/mL injection (1 mg  Given 7/4/17 2100)   vancomycin 1 g in dextrose 5 % 250 mL IVPB  (ready to mix system) (1,000 mg Intravenous New Bag 7/4/17 1137)   etomidate injection 20 mg (20 mg Intravenous Given by Other 7/4/17 5172)   propofol (DIPRIVAN) 10 mg/mL IVP (40 mg Intravenous Given by Other 7/4/17 9613)       Discharge Medication List as of 7/5/2017 12:35 AM                Medical Decision Making    Medical Decision Making:   Clinical Tests:   Lab Tests: Ordered and Reviewed  Radiological Study: Ordered and Reviewed           Scribe Attestation:   Scribe #1: I performed the above scribed service and the documentation accurately describes the services I performed. I attest to the accuracy of the note.    Attending:   Physician Attestation Statement for Scribe #1: I, Lennie Munoz DO, personally performed the services described in this documentation, as scribed by Ayanna Barraza, in my presence, and it is both accurate and complete.          Clinical Impression       ICD-10-CM ICD-9-CM   1. Abnormal CT of the head - air in subarchnoid space R93.0 793.0   2. Altered mental status R41.82 780.97   3. Nodule of left lung - 2 cm. R91.1 793.11   4. Closed fracture of nasal bone, initial encounter S02.2XXA 802.0   5. Drug use F19.90 305.90   6. Abrasion of cheek, initial encounter S00.81XA 910.0   7. Fall, initial encounter W19.XXXA E888.9   8. Diphtheria-tetanus-pertussis (DTP) vaccination Z23 V06.1   9. Encounter for intubation Z01.818 V72.83       Disposition:   Disposition: Transferred  Condition: Stable         Lennie Munoz,   07/05/17 0351       Lennie Munoz,   07/05/17 0353       Lennie Munoz,   07/06/17 0206

## 2021-02-22 ENCOUNTER — HISTORICAL (OUTPATIENT)
Dept: ADMINISTRATIVE | Facility: HOSPITAL | Age: 41
End: 2021-02-22

## 2021-02-25 LAB — FINAL CULTURE: NORMAL

## 2021-04-23 ENCOUNTER — HISTORICAL (OUTPATIENT)
Dept: LAB | Facility: HOSPITAL | Age: 41
End: 2021-04-23

## 2021-04-23 LAB
ABS NEUT (OLG): 6.07
ALBUMIN SERPL-MCNC: 4 GM/DL (ref 3.5–5)
ALBUMIN/GLOB SERPL: 1.4 RATIO (ref 1.1–2)
ALP SERPL-CCNC: 59 UNIT/L (ref 40–150)
ALT SERPL-CCNC: 63 UNIT/L (ref 0–55)
AST SERPL-CCNC: 32 UNIT/L (ref 5–34)
BASOPHILS # BLD AUTO: 0.01 X10(3)/MCL
BASOPHILS NFR BLD AUTO: 0.1 %
BILIRUB SERPL-MCNC: 0.8 MG/DL
BILIRUBIN DIRECT+TOT PNL SERPL-MCNC: 0.4 MG/DL
BILIRUBIN DIRECT+TOT PNL SERPL-MCNC: 0.4 MG/DL (ref 0–0.5)
BUN SERPL-MCNC: 13 MG/DL (ref 7–18.7)
CALCIUM SERPL-MCNC: 9 MG/DL (ref 8.4–10.2)
CHLORIDE SERPL-SCNC: 106 MMOL/L (ref 98–107)
CHOLEST SERPL-MCNC: 137 MG/DL
CHOLEST/HDLC SERPL: 2 {RATIO} (ref 0–5)
CO2 SERPL-SCNC: 29 MEQ/L (ref 22–29)
CREAT SERPL-MCNC: 0.77 MG/DL (ref 0.55–1.02)
EOSINOPHIL # BLD AUTO: 0.06 X10(3)/MCL
EOSINOPHIL NFR BLD AUTO: 0.7 %
ERYTHROCYTE [DISTWIDTH] IN BLOOD BY AUTOMATED COUNT: 14 %
GLOBULIN SER-MCNC: 2.8 GM/DL (ref 2.4–3.5)
GLUCOSE SERPL-MCNC: 102 MG/DL (ref 74–100)
HCT VFR BLD AUTO: 43.5 % (ref 34–46)
HDLC SERPL-MCNC: 73 MG/DL (ref 35–60)
HGB BLD-MCNC: 13.6 GM/DL (ref 11.3–15.4)
HIV 1+2 AB+HIV1 P24 AG SERPL QL IA: NONREACTIVE
IMM GRANULOCYTES # BLD AUTO: 0.01 10*3/UL (ref 0–0.1)
IMM GRANULOCYTES NFR BLD AUTO: 0.1 % (ref 0–1)
LDLC SERPL CALC-MCNC: 53 MG/DL (ref 50–140)
LYMPHOCYTES # BLD AUTO: 1.58 X10(3)/MCL
LYMPHOCYTES NFR BLD AUTO: 19 %
MCH RBC QN AUTO: 29.6 PG (ref 27–33)
MCHC RBC AUTO-ENTMCNC: 31.3 GM/DL (ref 32–35)
MCV RBC AUTO: 94.6 FL (ref 81–97)
MONOCYTES # BLD AUTO: 0.6 X10(3)/MCL
MONOCYTES NFR BLD AUTO: 7.2 %
NEUTROPHILS # BLD AUTO: 6.07 X10(3)/MCL
NEUTROPHILS NFR BLD AUTO: 72.9 %
PLATELET # BLD AUTO: 162 X10(3)/MCL (ref 140–450)
PMV BLD AUTO: 11 FL
POTASSIUM SERPL-SCNC: 4.4 MMOL/L (ref 3.5–5.1)
PROT SERPL-MCNC: 6.8 GM/DL (ref 6.4–8.3)
RBC # BLD AUTO: 4.6 X10(6)/MCL (ref 3.9–5)
RPR SER QL: NORMAL
SODIUM SERPL-SCNC: 141 MMOL/L (ref 136–145)
T PALLIDUM AB SER QL: REACTIVE
TRIGL SERPL-MCNC: 55 MG/DL (ref 37–140)
TSH SERPL-ACNC: 1.24 UIU/ML (ref 0.35–4.94)
VLDLC SERPL CALC-MCNC: 11 MG/DL
WBC # SPEC AUTO: 8.33 X10(3)/MCL (ref 3.4–9.2)

## 2021-06-07 ENCOUNTER — HISTORICAL (OUTPATIENT)
Dept: RADIOLOGY | Facility: HOSPITAL | Age: 41
End: 2021-06-07

## 2021-11-01 ENCOUNTER — HISTORICAL (OUTPATIENT)
Dept: RADIOLOGY | Facility: HOSPITAL | Age: 41
End: 2021-11-01

## 2022-02-21 ENCOUNTER — HISTORICAL (OUTPATIENT)
Dept: ADMINISTRATIVE | Facility: HOSPITAL | Age: 42
End: 2022-02-21

## 2022-02-25 ENCOUNTER — HISTORICAL (OUTPATIENT)
Dept: ADMINISTRATIVE | Facility: HOSPITAL | Age: 42
End: 2022-02-25

## 2022-11-09 ENCOUNTER — HOSPITAL ENCOUNTER (OUTPATIENT)
Dept: RADIOLOGY | Facility: HOSPITAL | Age: 42
Discharge: HOME | End: 2022-11-09
Attending: INTERNAL MEDICINE
Payer: MEDICAID

## 2022-11-09 DIAGNOSIS — B18.2 CHRONIC HEPATITIS C: ICD-10-CM

## 2022-11-09 PROCEDURE — 76705 ECHO EXAM OF ABDOMEN: CPT | Mod: TC

## 2022-12-10 ENCOUNTER — HOSPITAL ENCOUNTER (EMERGENCY)
Facility: HOSPITAL | Age: 42
Discharge: HOME | End: 2022-12-10
Attending: EMERGENCY MEDICINE
Payer: MEDICAID

## 2022-12-10 VITALS
OXYGEN SATURATION: 98 % | DIASTOLIC BLOOD PRESSURE: 87 MMHG | SYSTOLIC BLOOD PRESSURE: 131 MMHG | TEMPERATURE: 98 F | RESPIRATION RATE: 20 BRPM | WEIGHT: 160 LBS | BODY MASS INDEX: 27.31 KG/M2 | HEIGHT: 64 IN | HEART RATE: 52 BPM

## 2022-12-10 DIAGNOSIS — J06.9 VIRAL URI WITH COUGH: ICD-10-CM

## 2022-12-10 DIAGNOSIS — M79.642 LEFT HAND PAIN: Primary | ICD-10-CM

## 2022-12-10 DIAGNOSIS — L03.012 CELLULITIS OF FINGER OF LEFT HAND: ICD-10-CM

## 2022-12-10 LAB
FLUAV AG UPPER RESP QL IA.RAPID: NOT DETECTED
FLUBV AG UPPER RESP QL IA.RAPID: NOT DETECTED
SARS-COV-2 RNA RESP QL NAA+PROBE: NOT DETECTED
STREP A PCR (OHS): NOT DETECTED

## 2022-12-10 PROCEDURE — 0240U COVID/FLU A&B PCR: CPT | Performed by: EMERGENCY MEDICINE

## 2022-12-10 PROCEDURE — 87651 STREP A DNA AMP PROBE: CPT | Performed by: EMERGENCY MEDICINE

## 2022-12-10 PROCEDURE — 99284 EMERGENCY DEPT VISIT MOD MDM: CPT

## 2022-12-10 RX ORDER — MONTELUKAST SODIUM 10 MG/1
10 TABLET ORAL
COMMUNITY
Start: 2022-12-10

## 2022-12-10 RX ORDER — TRAZODONE HYDROCHLORIDE 50 MG/1
50 TABLET ORAL
COMMUNITY
Start: 2022-08-11

## 2022-12-10 RX ORDER — NAPROXEN 375 MG/1
375 TABLET ORAL 2 TIMES DAILY PRN
COMMUNITY
Start: 2022-11-22

## 2022-12-10 RX ORDER — GABAPENTIN 600 MG/1
600 TABLET ORAL 3 TIMES DAILY
COMMUNITY
Start: 2022-11-13

## 2022-12-10 RX ORDER — CARIPRAZINE 1.5 MG/1
1.5 CAPSULE, GELATIN COATED ORAL
COMMUNITY
Start: 2022-11-14

## 2022-12-10 RX ORDER — CLINDAMYCIN HYDROCHLORIDE 300 MG/1
300 CAPSULE ORAL EVERY 8 HOURS
Qty: 21 CAPSULE | Refills: 0 | Status: SHIPPED | OUTPATIENT
Start: 2022-12-10

## 2022-12-10 RX ORDER — FLUTICASONE PROPIONATE 50 MCG
1 SPRAY, SUSPENSION (ML) NASAL
COMMUNITY
Start: 2022-09-11 | End: 2023-09-28

## 2022-12-10 RX ORDER — METHOCARBAMOL 750 MG/1
750 TABLET, FILM COATED ORAL
COMMUNITY
Start: 2022-11-09

## 2022-12-10 RX ORDER — PRAZOSIN HYDROCHLORIDE 5 MG/1
5 CAPSULE ORAL NIGHTLY
COMMUNITY
Start: 2022-12-10

## 2022-12-10 RX ORDER — VELPATASVIR AND SOFOSBUVIR 100; 400 MG/1; MG/1
1 TABLET, FILM COATED ORAL
COMMUNITY
Start: 2022-11-14

## 2022-12-10 RX ORDER — PREDNISONE 20 MG/1
20 TABLET ORAL DAILY
Qty: 5 TABLET | Refills: 0 | Status: SHIPPED | OUTPATIENT
Start: 2022-12-10

## 2022-12-10 RX ORDER — CLINDAMYCIN HYDROCHLORIDE 300 MG/1
300 CAPSULE ORAL EVERY 8 HOURS
Qty: 21 CAPSULE | Refills: 0 | Status: SHIPPED | OUTPATIENT
Start: 2022-12-10 | End: 2022-12-10 | Stop reason: SDUPTHER

## 2022-12-10 RX ORDER — PREDNISONE 20 MG/1
20 TABLET ORAL DAILY
Qty: 5 TABLET | Refills: 0 | Status: SHIPPED | OUTPATIENT
Start: 2022-12-10 | End: 2022-12-10 | Stop reason: SDUPTHER

## 2022-12-10 NOTE — ED NOTES
Pt ambulated to ed rm 5 from [facility]. Aaox4. Reports left 2nd digit pain and swelling for 3 days. Pt denies injury to area. Also reports sore throat since yesterday. Reports sinus pressure in head. Cough but always has a small cough due to smoking. In no distress. Wctm

## 2022-12-10 NOTE — ED PROVIDER NOTES
Encounter Date: 12/10/2022       History     Chief Complaint   Patient presents with    Cough    Hand Pain     Sore throat, cough, and congestion started yesterday. Also c/o left index finger pain x 3 days     Patient is a 42-year-old female presenting with complain of sore throat x1 day.  Patient also states she has mild cough and congestion.  Patient also complains of pain to the left 2nd finger.  Patient denies trauma to the finger.  Patient denies hyperextension of the finger.  She states she is hurting most at the PIP joint.  Patient denies any shortness of breath.  No chest pain.  Patient denies abdominal pain, nausea, or vomiting.    Review of patient's allergies indicates:   Allergen Reactions    Cephalexin Hives     Other reaction(s): Unknown    Morphine      Other reaction(s): unknown, Unknown     Past Medical History:   Diagnosis Date    Depression     Other seasonal allergic rhinitis      History reviewed. No pertinent surgical history.  History reviewed. No pertinent family history.  Social History     Tobacco Use    Smoking status: Every Day   Substance Use Topics    Alcohol use: Yes    Drug use: Yes     Types: Cocaine, Marijuana, Methamphetamines     Comment: OPIATES     Review of Systems   Constitutional: Negative.    HENT:  Positive for congestion and sore throat. Negative for ear pain, sinus pressure, trouble swallowing and voice change.    Respiratory:  Positive for cough. Negative for choking, chest tightness, shortness of breath, wheezing and stridor.    Cardiovascular: Negative.    Gastrointestinal: Negative.    Genitourinary: Negative.    Musculoskeletal:  Positive for arthralgias and myalgias. Negative for back pain, gait problem, joint swelling, neck pain and neck stiffness.   Skin: Negative.    Neurological: Negative.    Psychiatric/Behavioral: Negative.       Physical Exam     Initial Vitals [12/10/22 1425]   BP Pulse Resp Temp SpO2   131/87 68 20 98.4 °F (36.9 °C) 97 %      MAP       --          Physical Exam    Nursing note and vitals reviewed.  Constitutional: She appears well-developed and well-nourished.   HENT:   Head: Normocephalic and atraumatic.   Mouth/Throat: Oropharynx is clear and moist.   Neck: Neck supple.   Normal range of motion.  Cardiovascular:  Normal rate, regular rhythm and normal heart sounds.           Pulmonary/Chest: Breath sounds normal. No respiratory distress. She has no wheezes. She has no rhonchi. She has no rales.   Abdominal: Abdomen is soft. There is no abdominal tenderness.   Musculoskeletal:         General: Normal range of motion.      Cervical back: Normal range of motion and neck supple.      Comments: Patient has mild tenderness to palpation to the left 2nd finger, PIP joint.  No swelling.  No warmth over the joint, no redness.  No pain on passive range of motion of the finger.     Neurological: She is alert and oriented to person, place, and time. She has normal strength.   Skin: Skin is warm and dry.   Psychiatric: She has a normal mood and affect. Her behavior is normal. Thought content normal.       ED Course   Procedures  Labs Reviewed   COVID/FLU A&B PCR - Normal    Narrative:     The Xpert Xpress SARS-CoV-2/FLU/RSV plus is a rapid, multiplexed real-time PCR test intended for the simultaneous qualitative detection and differentiation of SARS-CoV-2, Influenza A, Influenza B, and respiratory syncytial virus (RSV) viral RNA in either nasopharyngeal swab or nasal swab specimens.         STREP GROUP A BY PCR - Normal    Narrative:     The Xpert Xpress Strep A test is a rapid, qualitative in vitro diagnostic test for the detection of Streptococcus pyogenes (Group A ß-hemolytic Streptococcus, Strep A) in throat swab specimens from patients with signs and symptoms of pharyngitis.            Imaging Results              X-Ray Finger 2 or More Views Left (Final result)  Result time 12/10/22 15:44:38      Final result by Dontrell Viera MD (12/10/22 15:44:38)                    Impression:      Degenerative changes in the D IP joint without acute abnormality of the left index finger.      Electronically signed by: Dontrell Viera MD  Date:    12/10/2022  Time:    15:44               Narrative:    EXAMINATION:  Three radiographic views of the FINGER (LEFT).    CLINICAL HISTORY:  Left 2nd finger pain;    TECHNIQUE:  Three radiographic views of the FINGER (LEFT)    COMPARISON:  None.    FINDINGS:  Three views of the left index finger demonstrate normal alignment.  There is no fracture.  There are degenerative changes in the D IP joint.                                    X-Rays:   Independently Interpreted Readings:   Other Readings:  No acute changes seen on x-rays of the finger  Medications - No data to display                           Clinical Impression:   Final diagnoses:  [M79.642] Left hand pain (Primary)  [J06.9] Viral URI with cough  [L03.012] Cellulitis of finger of left hand        ED Disposition Condition    Discharge Stable          ED Prescriptions       Medication Sig Dispense Start Date End Date Auth. Provider    predniSONE (DELTASONE) 20 MG tablet  (Status: Discontinued) Take 1 tablet (20 mg total) by mouth once daily. 5 tablet 12/10/2022 12/10/2022 Kyrie Wilson MD    clindamycin (CLEOCIN) 300 MG capsule  (Status: Discontinued) Take 1 capsule (300 mg total) by mouth every 8 (eight) hours. 21 capsule 12/10/2022 12/10/2022 Kyrie Wilson MD    clindamycin (CLEOCIN) 300 MG capsule Take 1 capsule (300 mg total) by mouth every 8 (eight) hours. 21 capsule 12/10/2022 -- Kyrie Wilson MD    predniSONE (DELTASONE) 20 MG tablet Take 1 tablet (20 mg total) by mouth once daily. 5 tablet 12/10/2022 -- Kyrie Wilson MD          Follow-up Information       Follow up With Specialties Details Why Contact Info    Brooklynn Joiner NP Family Medicine In 2 days  [address] [facility]  [place] [address]  [phone]      [facility] Emergency Medicine   If symptoms worsen [address] [facility] [address]  [phone]             Kyrie Wilson MD  12/10/22 1544       Kyrie Wilson MD  12/10/22 8689       Kyrie Wilson MD  01/07/23 2886

## 2023-09-28 ENCOUNTER — HOSPITAL ENCOUNTER (EMERGENCY)
Facility: HOSPITAL | Age: 43
Discharge: HOME | End: 2023-09-28
Attending: GENERAL PRACTICE
Payer: MEDICAID

## 2023-09-28 VITALS
TEMPERATURE: 96 F | HEART RATE: 54 BPM | OXYGEN SATURATION: 99 % | SYSTOLIC BLOOD PRESSURE: 127 MMHG | BODY MASS INDEX: 27.31 KG/M2 | RESPIRATION RATE: 20 BRPM | HEIGHT: 64 IN | DIASTOLIC BLOOD PRESSURE: 73 MMHG | WEIGHT: 160 LBS

## 2023-09-28 DIAGNOSIS — J30.2 SEASONAL ALLERGIC RHINITIS, UNSPECIFIED TRIGGER: Primary | ICD-10-CM

## 2023-09-28 LAB
INFLUENZA A (OHS): NEGATIVE
INFLUENZA B (OHS): NEGATIVE
SARS-COV-2 RDRP RESP QL NAA+PROBE: NEGATIVE
STREP A PCR (OHS): NOT DETECTED

## 2023-09-28 PROCEDURE — 87651 STREP A DNA AMP PROBE: CPT | Performed by: GENERAL PRACTICE

## 2023-09-28 PROCEDURE — 87502 INFLUENZA DNA AMP PROBE: CPT | Performed by: GENERAL PRACTICE

## 2023-09-28 PROCEDURE — 87635 SARS-COV-2 COVID-19 AMP PRB: CPT | Performed by: GENERAL PRACTICE

## 2023-09-28 PROCEDURE — 99283 EMERGENCY DEPT VISIT LOW MDM: CPT

## 2023-09-28 RX ORDER — FLUTICASONE PROPIONATE 50 MCG
1 SPRAY, SUSPENSION (ML) NASAL 2 TIMES DAILY PRN
Qty: 15 G | Refills: 0 | Status: SHIPPED | OUTPATIENT
Start: 2023-09-28

## 2023-09-28 RX ORDER — LORATADINE 10 MG/1
10 TABLET ORAL DAILY
Qty: 60 TABLET | Refills: 0 | Status: SHIPPED | OUTPATIENT
Start: 2023-09-28 | End: 2024-09-27

## 2023-09-28 NOTE — ED PROVIDER NOTES
Encounter Date: 9/28/2023       History     Chief Complaint   Patient presents with    Sore Throat    Nasal Congestion    Generalized Body Aches    Chills     Sore throat, nasal congestion, body aches, fever x 3 days.       HPI  Review of patient's allergies indicates:   Allergen Reactions    Cephalexin Hives     Other reaction(s): Unknown    Morphine      Other reaction(s): unknown, Unknown     Past Medical History:   Diagnosis Date    Depression     Other seasonal allergic rhinitis      History reviewed. No pertinent surgical history.  History reviewed. No pertinent family history.  Social History     Tobacco Use    Smoking status: Every Day     Types: Vaping with nicotine   Substance Use Topics    Alcohol use: Yes    Drug use: Yes     Types: Cocaine, Marijuana, Methamphetamines     Comment: OPIATES     Review of Systems   Constitutional: Negative.    HENT:  Positive for congestion, rhinorrhea and sore throat.    Eyes: Negative.    Respiratory: Negative.     Cardiovascular: Negative.    Gastrointestinal: Negative.    Endocrine: Negative.    Genitourinary: Negative.    Musculoskeletal: Negative.    Skin: Negative.    Allergic/Immunologic: Negative.    Neurological: Negative.    Hematological: Negative.    Psychiatric/Behavioral: Negative.     All other systems reviewed and are negative.      Physical Exam     Initial Vitals [09/28/23 1601]   BP Pulse Resp Temp SpO2   127/73 (!) 54 20 96.3 °F (35.7 °C) 99 %      MAP       --         Physical Exam    Nursing note and vitals reviewed.  Constitutional: She appears well-developed and well-nourished.   HENT:   Head: Normocephalic and atraumatic.   Eyes: EOM are normal. Pupils are equal, round, and reactive to light.   Neck: Neck supple.   Normal range of motion.  Cardiovascular:  Normal rate, regular rhythm, normal heart sounds and intact distal pulses.           Pulmonary/Chest: Breath sounds normal.   Abdominal: Abdomen is soft. Bowel sounds are normal.    Musculoskeletal:         General: Normal range of motion.      Cervical back: Normal range of motion and neck supple.     Neurological: She is alert and oriented to person, place, and time. She has normal strength. GCS score is 15. GCS eye subscore is 4. GCS verbal subscore is 5. GCS motor subscore is 6.   Skin: Skin is warm and dry.   Psychiatric: She has a normal mood and affect. Her behavior is normal. Judgment and thought content normal.         ED Course   Procedures  Labs Reviewed   RAPID INFLUENZA A/B - Normal   SARS-COV-2 RNA AMPLIFICATION, QUAL - Normal    Narrative:     The IDNOW COVID-19 assay is a rapid molecular in vitro diagnostic test utilizing an isothermal nucleic acid amplification technology intended for the qualitative detection of nucleic acid from the SARS-CoV-2 viral RNA in direct nasal, nasopharyngeal or throat swabs from individuals who are suspected of COVID-19 by their healthcare provider.   STREP GROUP A BY PCR - Normal    Narrative:     The Xpert Xpress Strep A test is a rapid, qualitative in vitro diagnostic test for the detection of Streptococcus pyogenes (Group A ß-hemolytic Streptococcus, Strep A) in throat swab specimens from patients with signs and symptoms of pharyngitis.            Imaging Results    None          Medications - No data to display  Medical Decision Making  Workup is negative.  This likely represents allergic rhinitis.    Amount and/or Complexity of Data Reviewed  Labs: ordered.                               Clinical Impression:   Final diagnoses:  [J30.2] Seasonal allergic rhinitis, unspecified trigger (Primary)        ED Disposition Condition    Discharge Stable          ED Prescriptions       Medication Sig Dispense Start Date End Date Auth. Provider    fluticasone propionate (FLONASE) 50 mcg/actuation nasal spray 1 spray (50 mcg total) by Each Nostril route 2 (two) times daily as needed for Rhinitis. 15 g 9/28/2023 -- Malik Catalan MD    loratadine  (CLARITIN) 10 mg tablet Take 1 tablet (10 mg total) by mouth once daily. 60 tablet 9/28/2023 9/27/2024 Malik Catalan MD          Follow-up Information       Follow up With Specialties Details Why Contact Info    Brooklynn Joiner, NP Family Medicine In 2 days As needed [address] [facility] [address]  [phone]               Malik Catalan MD  09/28/23 1700       Malik Catalan MD  09/28/23 8630